# Patient Record
Sex: MALE | Race: BLACK OR AFRICAN AMERICAN | NOT HISPANIC OR LATINO | Employment: UNEMPLOYED | URBAN - METROPOLITAN AREA
[De-identification: names, ages, dates, MRNs, and addresses within clinical notes are randomized per-mention and may not be internally consistent; named-entity substitution may affect disease eponyms.]

---

## 2018-11-27 ENCOUNTER — WALK IN (OUTPATIENT)
Dept: URGENT CARE | Age: 28
End: 2018-11-27

## 2018-11-27 ENCOUNTER — LAB SERVICES (OUTPATIENT)
Dept: LAB | Age: 28
End: 2018-11-27

## 2018-11-27 VITALS
OXYGEN SATURATION: 99 % | WEIGHT: 207 LBS | DIASTOLIC BLOOD PRESSURE: 83 MMHG | RESPIRATION RATE: 20 BRPM | BODY MASS INDEX: 28.04 KG/M2 | SYSTOLIC BLOOD PRESSURE: 136 MMHG | HEART RATE: 86 BPM | HEIGHT: 72 IN | TEMPERATURE: 97.7 F

## 2018-11-27 DIAGNOSIS — J02.9 PHARYNGITIS, UNSPECIFIED ETIOLOGY: ICD-10-CM

## 2018-11-27 DIAGNOSIS — Z11.3 SCREENING FOR STDS (SEXUALLY TRANSMITTED DISEASES): ICD-10-CM

## 2018-11-27 DIAGNOSIS — Z11.3 SCREENING FOR STDS (SEXUALLY TRANSMITTED DISEASES): Primary | ICD-10-CM

## 2018-11-27 PROCEDURE — 87591 N.GONORRHOEAE DNA AMP PROB: CPT | Performed by: INTERNAL MEDICINE

## 2018-11-27 PROCEDURE — 99214 OFFICE O/P EST MOD 30 MIN: CPT | Performed by: FAMILY MEDICINE

## 2018-11-27 PROCEDURE — 36415 COLL VENOUS BLD VENIPUNCTURE: CPT | Performed by: INTERNAL MEDICINE

## 2018-11-27 PROCEDURE — 87491 CHLMYD TRACH DNA AMP PROBE: CPT | Performed by: INTERNAL MEDICINE

## 2018-11-27 PROCEDURE — 87389 HIV-1 AG W/HIV-1&-2 AB AG IA: CPT | Performed by: INTERNAL MEDICINE

## 2018-11-27 PROCEDURE — 86592 SYPHILIS TEST NON-TREP QUAL: CPT | Performed by: INTERNAL MEDICINE

## 2018-11-27 PROCEDURE — 87661 TRICHOMONAS VAGINALIS AMPLIF: CPT | Performed by: INTERNAL MEDICINE

## 2018-11-27 RX ORDER — AMOXICILLIN AND CLAVULANATE POTASSIUM 875; 125 MG/1; MG/1
1 TABLET, FILM COATED ORAL EVERY 12 HOURS
Qty: 20 TABLET | Refills: 0 | Status: SHIPPED | OUTPATIENT
Start: 2018-11-27

## 2018-11-28 LAB
C TRACH RRNA SPEC QL NAA+PROBE: NEGATIVE
HIV 1+2 AB+HIV1 P24 AG SERPL QL IA: NONREACTIVE
N GONORRHOEA RRNA SPEC QL NAA+PROBE: NEGATIVE
RPR SER QL: NONREACTIVE
SPECIMEN SOURCE: NORMAL
SPECIMEN SOURCE: NORMAL
T VAGINALIS RRNA SPEC QL NAA+PROBE: NEGATIVE

## 2023-12-11 ENCOUNTER — HOSPITAL ENCOUNTER (EMERGENCY)
Facility: HOSPITAL | Age: 33
Discharge: HOME | End: 2023-12-11
Attending: INTERNAL MEDICINE

## 2023-12-11 VITALS
WEIGHT: 300 LBS | TEMPERATURE: 98 F | SYSTOLIC BLOOD PRESSURE: 133 MMHG | RESPIRATION RATE: 18 BRPM | HEIGHT: 72 IN | OXYGEN SATURATION: 97 % | HEART RATE: 81 BPM | BODY MASS INDEX: 40.63 KG/M2 | DIASTOLIC BLOOD PRESSURE: 80 MMHG

## 2023-12-11 DIAGNOSIS — K08.89 PAIN, DENTAL: Primary | ICD-10-CM

## 2023-12-11 PROCEDURE — 99284 EMERGENCY DEPT VISIT MOD MDM: CPT

## 2023-12-11 RX ORDER — HYDROCODONE BITARTRATE AND ACETAMINOPHEN 5; 325 MG/1; MG/1
1 TABLET ORAL EVERY 8 HOURS PRN
Qty: 15 TABLET | Refills: 0 | Status: SHIPPED | OUTPATIENT
Start: 2023-12-11 | End: 2023-12-16

## 2023-12-11 RX ORDER — AMOXICILLIN AND CLAVULANATE POTASSIUM 875; 125 MG/1; MG/1
1 TABLET, FILM COATED ORAL 2 TIMES DAILY
Qty: 14 TABLET | Refills: 0 | Status: SHIPPED | OUTPATIENT
Start: 2023-12-11 | End: 2023-12-18

## 2023-12-11 NOTE — ED PROVIDER NOTES
Encounter Date: 12/11/2023       History     Chief Complaint   Patient presents with    Dental Pain     C/o having a cracked tooth to left jaw with pain for 6 months     33-year-old male presents to ED for evaluation of left upper dental pain and swelling.  States that he has been having dental pain for the last several months.  States it has gradually worsened over the last several days.  States he was unable to open his mouth or eat or get air on due to pain.  Reports he is not been able to see the dentist.  States he needs antibiotics to cover for any infection.  Has been using topical lidocaine and ibuprofen without relief.  Denies any trouble swallowing.  Denies shortness of breath or chest pain    The history is provided by the patient. No  was used.     Review of patient's allergies indicates:  No Known Allergies  No past medical history on file.  No past surgical history on file.  No family history on file.  Social History     Tobacco Use    Smoking status: Every Day     Types: Cigarettes    Smokeless tobacco: Never     Review of Systems   Constitutional:  Negative for chills and fever.   HENT:  Positive for dental problem. Negative for congestion, ear pain, facial swelling, sore throat, trouble swallowing and voice change.    Respiratory:  Negative for cough and shortness of breath.    Cardiovascular: Negative.    Gastrointestinal: Negative.    Musculoskeletal: Negative.    Neurological:  Negative for dizziness and headaches.   All other systems reviewed and are negative.      Physical Exam     Initial Vitals [12/11/23 1400]   BP Pulse Resp Temp SpO2   138/78 110 20 97.5 °F (36.4 °C) 98 %      MAP       --         Physical Exam    Nursing note and vitals reviewed.  Constitutional: He appears well-developed and well-nourished. He is cooperative.   HENT:   Head: Normocephalic and atraumatic.   Right Ear: Tympanic membrane and external ear normal.   Left Ear: Tympanic membrane and external  ear normal.   Mouth/Throat: Uvula is midline, oropharynx is clear and moist and mucous membranes are normal. No trismus in the jaw. No uvula swelling. No oropharyngeal exudate, posterior oropharyngeal edema or posterior oropharyngeal erythema.       Eyes: Conjunctivae are normal. Pupils are equal, round, and reactive to light.   Neck: Neck supple.   Normal range of motion.  Cardiovascular:  Normal rate, regular rhythm and normal heart sounds.           Pulmonary/Chest: Breath sounds normal. No respiratory distress. He has no wheezes. He has no rhonchi. He has no rales.   Abdominal: Abdomen is soft. Bowel sounds are normal. There is no abdominal tenderness.   Musculoskeletal:         General: Normal range of motion.      Cervical back: Normal range of motion and neck supple.     Neurological: He is alert and oriented to person, place, and time.   Skin: Skin is warm and dry. Capillary refill takes less than 2 seconds.   Psychiatric: He has a normal mood and affect.         ED Course   Procedures  Labs Reviewed - No data to display       Imaging Results    None          Medications - No data to display  Medical Decision Making  33-year-old male presents to ED for evaluation of left upper dental pain and swelling.  States that he has been having dental pain for the last several months.  States it has gradually worsened over the last several days.  States he was unable to open his mouth or eat or get air on due to pain.  Reports he is not been able to see the dentist.  States he needs antibiotics to cover for any infection.  Has been using topical lidocaine and ibuprofen without relief.  Denies any trouble swallowing.  Denies shortness of breath or chest pain    Differential diagnosis includes but is not limited to dental pain, dental caries, dental abscess    Amount and/or Complexity of Data Reviewed  Discussion of management or test interpretation with external provider(s): Patient presents to ED for evaluation of  upper dental pain and swelling.  Patient reports a cracked tooth.  Requesting antibiotics until he can see the dentist.  Will give short course of pain medication.  Patient has no trismus.  Discussed he needs to follow up with dentist for further evaluation and possibly have his tooth pulled.  Given dental resources.    Risk  OTC drugs.  Prescription drug management.  Parenteral controlled substances.                                      Clinical Impression:  Final diagnoses:  [K08.89] Pain, dental (Primary)          ED Disposition Condition    Discharge Stable          ED Prescriptions       Medication Sig Dispense Start Date End Date Auth. Provider    amoxicillin-clavulanate 875-125mg (AUGMENTIN) 875-125 mg per tablet Take 1 tablet by mouth 2 (two) times daily. for 7 days 14 tablet 12/11/2023 12/18/2023 Sally Marie PA    HYDROcodone-acetaminophen (NORCO) 5-325 mg per tablet Take 1 tablet by mouth every 8 (eight) hours as needed for Pain. 15 tablet 12/11/2023 12/16/2023 Sally Marie PA          Follow-up Information       Follow up With Specialties Details Why Contact Info    PCP  In 1 week As needed, If you do not have a PCP you may call [phone] to help get set up If you do not have a PCP you may call [phone] to help get set up.    Dentist   7-10 days, As needed              Sally Marie PA  12/11/23 3687

## 2023-12-11 NOTE — DISCHARGE INSTRUCTIONS
Take full course of antibiotics.  Use ibuprofen for pain and swelling.  May use Norco for severe pain.  Do not drink or drive while taking narcotics it can be sedating.    [facility] [address] [place] [address] [phone] Medicaid/Medicare   Dr. Alessandro Chapa, DDS  [address] [place] [address] [phone] Medicaid   [facility] [address] [place] [address] [phone] Medicaid   [facility] [address] [place] [address] [phone] Medicare [facility]  [address] [place] [address] [phone] Medicaid/Medicare   [facility] [address] [place] [address] [phone] Medicaid   [facility]  [address]  [place] [address] [phone] Medicaid for ages 5 and under; or for lip/tongue tie    Dr. Erich Rubi, DDS [address] [place] [address] [phone] Medicaid-only for children ages 2 to 21   [facility] [address]  [place] [address] [phone] Medicaid   [facility] [address] [place] [address] [phone] Medicare [facility] [address] [place] [address] [phone] Medicaid   [facility] [address] [place] [address] [phone] Medicaid-Pediatric dentistry    [facility] [address] [place] [address] [phone] Medicaid/Medicare   [facility] [address] [place] [address] [phone] Medicaid/Medicare   [facility] [address]  [place] [address] [phone] Medicaid/Medicare   [facility] [address] [place] [address] [phone] Medicaid/Medicare   [place] [address] [address] [phone]  Medicaid/Medicare   [facility] [address] [place] [address] [phone] Medicaid-Pediatric and adult   [facility] [address]  [place], [place] [id number] [phone] Medicaid   [facility] [address] [place] [address] [phone] Medicare   Dr. Amee Donnelly,  [place] [address] [phone] Medicare for dentures only

## 2023-12-16 ENCOUNTER — HOSPITAL ENCOUNTER (EMERGENCY)
Facility: HOSPITAL | Age: 33
Discharge: HOME | End: 2023-12-16
Attending: INTERNAL MEDICINE

## 2023-12-16 VITALS
RESPIRATION RATE: 20 BRPM | DIASTOLIC BLOOD PRESSURE: 83 MMHG | HEIGHT: 72 IN | TEMPERATURE: 98 F | WEIGHT: 257 LBS | HEART RATE: 75 BPM | OXYGEN SATURATION: 100 % | SYSTOLIC BLOOD PRESSURE: 147 MMHG | BODY MASS INDEX: 34.81 KG/M2

## 2023-12-16 DIAGNOSIS — G89.29 CHRONIC DENTAL PAIN: Primary | ICD-10-CM

## 2023-12-16 DIAGNOSIS — K08.9 CHRONIC DENTAL PAIN: Primary | ICD-10-CM

## 2023-12-16 PROCEDURE — 99281 EMR DPT VST MAYX REQ PHY/QHP: CPT

## 2023-12-16 NOTE — ED PROVIDER NOTES
Encounter Date: 12/16/2023  History from patient     History     Chief Complaint   Patient presents with    Dental Pain     C/o cracked tooth for 2-3 months. States he can barely drive due to the pain     HPI    Mango Seymour is 33 y.o. male who  has no past medical history on file. arrives in ER with c/o Dental Pain (C/o cracked tooth for 2-3 months. States he can barely drive due to the pain)      Review of patient's allergies indicates:  No Known Allergies  History reviewed. No pertinent past medical history.  History reviewed. No pertinent surgical history.  History reviewed. No pertinent family history.  Social History     Tobacco Use    Smoking status: Every Day     Types: Cigarettes    Smokeless tobacco: Never   Substance Use Topics    Alcohol use: Not Currently    Drug use: Not Currently     Review of Systems   HENT:  Positive for dental problem. Negative for trouble swallowing and voice change.    Eyes:  Negative for visual disturbance.   Respiratory:  Negative for cough and shortness of breath.    Cardiovascular:  Negative for chest pain.   Gastrointestinal:  Negative for abdominal pain, diarrhea and vomiting.   Genitourinary:  Negative for dysuria and hematuria.   Musculoskeletal:  Negative for gait problem.        No Pain.   Skin:  Negative for color change and rash.   Neurological:  Negative for headaches.   Psychiatric/Behavioral:  Negative for behavioral problems and sleep disturbance.    All other systems reviewed and are negative.      Physical Exam     Initial Vitals [12/16/23 0940]   BP Pulse Resp Temp SpO2   (!) 147/83 75 20 98.1 °F (36.7 °C) 100 %      MAP       --         Physical Exam    Nursing note and vitals reviewed.  Constitutional: No distress.   HENT:   Head: Normocephalic and atraumatic.   There is no dental particular tenderness, there is no swelling around the tooth, there is a cavity in the premolar,   Eyes: EOM are normal.   Cardiovascular:  Normal heart sounds.            Pulmonary/Chest: Breath sounds normal.   Abdominal: Abdomen is soft. Bowel sounds are normal.   Musculoskeletal:         General: Normal range of motion.      Cervical back: No bony tenderness.     Neurological: He is alert.   Speech Normal   Skin: Skin is dry.   Psychiatric: He has a normal mood and affect.   Pleasant         ED Course   Procedures  Labs Reviewed - No data to display       Imaging Results    None          Medications - No data to display  Medical Decision Making    Mango Seymour is 33 y.o. male who  has no past medical history on file. arrives in ER with c/o Dental Pain (C/o cracked tooth for 2-3 months. States he can barely drive due to the pain)      Patient has been having pain in the premolar since he got his molar pulled a few years back, and he says he goes to the emergency room and nothing is getting better, he is never gone back to the dentist, I have explained to him that he has a dental problem which has to be addressed by a dentist he already was prescribed antibiotic and pain medicine recently in the emergency room, he wants to take care of the tooth today, and I have advised him that he can always go and find a dentist in [place] if 1 of them is open on a weekend, otherwise he can see the dentist on Monday.  Patient verbalized understanding and I am going to let him go home.                                      Clinical Impression:  Final diagnoses:  [K08.9, G89.29] Chronic dental pain (Primary)          ED Disposition Condition    Discharge Stable          ED Prescriptions    None       Follow-up Information       Follow up With Specialties Details Why Contact Info    PMD  In 2 days      Dentist                 Stu Hester MD  12/16/23 8409